# Patient Record
Sex: FEMALE | HISPANIC OR LATINO | URBAN - NONMETROPOLITAN AREA
[De-identification: names, ages, dates, MRNs, and addresses within clinical notes are randomized per-mention and may not be internally consistent; named-entity substitution may affect disease eponyms.]

---

## 2018-01-02 ENCOUNTER — NON-PROVIDER VISIT (OUTPATIENT)
Dept: MEDICAL GROUP | Facility: CLINIC | Age: 38
End: 2018-01-02
Payer: MEDICAID

## 2018-01-02 ENCOUNTER — TELEMEDICINE ORIGINATING SITE VISIT (OUTPATIENT)
Dept: MEDICAL GROUP | Facility: CLINIC | Age: 38
End: 2018-01-02
Payer: MEDICAID

## 2018-01-02 ENCOUNTER — TELEMEDICINE2 (OUTPATIENT)
Dept: MEDICAL GROUP | Facility: PHYSICIAN GROUP | Age: 38
End: 2018-01-02
Payer: MEDICAID

## 2018-01-02 VITALS
WEIGHT: 218 LBS | OXYGEN SATURATION: 91 % | HEIGHT: 60 IN | DIASTOLIC BLOOD PRESSURE: 80 MMHG | RESPIRATION RATE: 18 BRPM | TEMPERATURE: 99.5 F | SYSTOLIC BLOOD PRESSURE: 124 MMHG | BODY MASS INDEX: 42.8 KG/M2 | HEART RATE: 103 BPM

## 2018-01-02 DIAGNOSIS — Z97.5 IUD (INTRAUTERINE DEVICE) IN PLACE: ICD-10-CM

## 2018-01-02 DIAGNOSIS — R06.02 SHORTNESS OF BREATH: ICD-10-CM

## 2018-01-02 DIAGNOSIS — G43.909 MIGRAINE WITHOUT STATUS MIGRAINOSUS, NOT INTRACTABLE, UNSPECIFIED MIGRAINE TYPE: ICD-10-CM

## 2018-01-02 DIAGNOSIS — R07.9 CHEST PAIN, UNSPECIFIED TYPE: ICD-10-CM

## 2018-01-02 DIAGNOSIS — R10.10 PAIN OF UPPER ABDOMEN: ICD-10-CM

## 2018-01-02 DIAGNOSIS — F33.9 RECURRENT MAJOR DEPRESSIVE DISORDER, REMISSION STATUS UNSPECIFIED (HCC): ICD-10-CM

## 2018-01-02 DIAGNOSIS — B19.20 HEPATITIS C VIRUS INFECTION WITHOUT HEPATIC COMA, UNSPECIFIED CHRONICITY: ICD-10-CM

## 2018-01-02 DIAGNOSIS — K21.9 GASTROESOPHAGEAL REFLUX DISEASE WITHOUT ESOPHAGITIS: ICD-10-CM

## 2018-01-02 DIAGNOSIS — B18.2 HEP C W/ COMA, CHRONIC: ICD-10-CM

## 2018-01-02 DIAGNOSIS — G47.09 OTHER INSOMNIA: ICD-10-CM

## 2018-01-02 PROBLEM — F32.9 MAJOR DEPRESSIVE DISORDER: Status: ACTIVE | Noted: 2018-01-02

## 2018-01-02 LAB
APPEARANCE UR: NORMAL
BILIRUB UR STRIP-MCNC: NEGATIVE MG/DL
COLOR UR AUTO: YELLOW
GLUCOSE UR STRIP.AUTO-MCNC: NEGATIVE MG/DL
KETONES UR STRIP.AUTO-MCNC: NEGATIVE MG/DL
LEUKOCYTE ESTERASE UR QL STRIP.AUTO: NORMAL
NITRITE UR QL STRIP.AUTO: NEGATIVE
PH UR STRIP.AUTO: 6 [PH] (ref 5–8)
PROT UR QL STRIP: NEGATIVE MG/DL
RBC UR QL AUTO: NORMAL
SP GR UR STRIP.AUTO: 1.02
UROBILINOGEN UR STRIP-MCNC: 0.2 MG/DL

## 2018-01-02 PROCEDURE — 81002 URINALYSIS NONAUTO W/O SCOPE: CPT | Performed by: NURSE PRACTITIONER

## 2018-01-02 PROCEDURE — 99204 OFFICE O/P NEW MOD 45 MIN: CPT | Mod: GT,25 | Performed by: NURSE PRACTITIONER

## 2018-01-02 PROCEDURE — 93000 ELECTROCARDIOGRAM COMPLETE: CPT | Performed by: NURSE PRACTITIONER

## 2018-01-02 RX ORDER — GABAPENTIN 300 MG/1
300 CAPSULE ORAL 3 TIMES DAILY
Refills: 1 | COMMUNITY
Start: 2017-12-29

## 2018-01-02 RX ORDER — SUMATRIPTAN 100 MG/1
TABLET, FILM COATED ORAL
Refills: 9 | COMMUNITY
Start: 2017-12-01

## 2018-01-02 RX ORDER — OMEPRAZOLE 20 MG/1
20 CAPSULE, DELAYED RELEASE ORAL DAILY
Qty: 30 CAP | Refills: 1 | Status: SHIPPED | OUTPATIENT
Start: 2018-01-02 | End: 2018-01-03 | Stop reason: SDUPTHER

## 2018-01-02 RX ORDER — BUTALBITAL, ACETAMINOPHEN AND CAFFEINE 50; 325; 40 MG/1; MG/1; MG/1
TABLET ORAL
Refills: 0 | COMMUNITY
Start: 2017-11-01

## 2018-01-02 RX ORDER — ONDANSETRON 4 MG/1
TABLET, ORALLY DISINTEGRATING ORAL
Refills: 0 | COMMUNITY
Start: 2017-12-01

## 2018-01-02 RX ORDER — ARIPIPRAZOLE 10 MG/1
TABLET ORAL
Refills: 1 | COMMUNITY
Start: 2017-12-29

## 2018-01-02 RX ORDER — TRAZODONE HYDROCHLORIDE 300 MG/1
300 TABLET ORAL
Refills: 9 | COMMUNITY
Start: 2017-12-01 | End: 2018-01-03

## 2018-01-02 RX ORDER — BUPROPION HYDROCHLORIDE 150 MG/1
150 TABLET ORAL EVERY MORNING
Refills: 1 | COMMUNITY
Start: 2017-12-29

## 2018-01-02 RX ORDER — MISOPROSTOL 200 UG/1
TABLET ORAL
Refills: 0 | COMMUNITY
Start: 2017-10-19

## 2018-01-02 RX ORDER — LOPERAMIDE HYDROCHLORIDE 2 MG/1
2 CAPSULE ORAL 4 TIMES DAILY PRN
Qty: 30 CAP | Refills: 1 | Status: SHIPPED | OUTPATIENT
Start: 2018-01-02 | End: 2018-01-03 | Stop reason: SDUPTHER

## 2018-01-02 ASSESSMENT — PATIENT HEALTH QUESTIONNAIRE - PHQ9
SUM OF ALL RESPONSES TO PHQ QUESTIONS 1-9: 21
CLINICAL INTERPRETATION OF PHQ2 SCORE: 5
5. POOR APPETITE OR OVEREATING: 3 - NEARLY EVERY DAY

## 2018-01-02 NOTE — PATIENT INSTRUCTIONS
1. Please obtain thermometer  2. Hydrate with water all day long  3. You can take Imodium for diarrhea  4. YOU NEED TO have follow up with Primary for the HEP C .

## 2018-01-02 NOTE — ASSESSMENT & PLAN NOTE
Patient was identified has having HEP C during a GYN visit. She admits to IVDU with Meth in 2016. Remaining labs for HIV were negative. She is currently scheduled to see a PCP at [place] on January 25, 2018. She is unsure of the next steps.  She currently has abdominal pain and bloating since October 2017.  Dark urine  Diarrhea explosive  No icterus  Appetite is poor but no weight loss.  Difficult to hydrate

## 2018-01-02 NOTE — PROGRESS NOTES
Kiara Chin is a 37 y.o. female here for a non-provider visit for UA & EKG    If abnormal was an in office provider notified today (if so, indicate provider)? Yes  Routed to PCP? Yes

## 2018-01-03 ENCOUNTER — TELEPHONE (OUTPATIENT)
Dept: MEDICAL GROUP | Facility: PHYSICIAN GROUP | Age: 38
End: 2018-01-03

## 2018-01-03 ENCOUNTER — TELEPHONE (OUTPATIENT)
Dept: MEDICAL GROUP | Facility: CLINIC | Age: 38
End: 2018-01-03

## 2018-01-03 RX ORDER — TRAZODONE HYDROCHLORIDE 100 MG/1
200 TABLET ORAL
Qty: 60 TAB | Refills: 3 | Status: SHIPPED
Start: 2018-01-03

## 2018-01-03 RX ORDER — LOPERAMIDE HYDROCHLORIDE 2 MG/1
2 CAPSULE ORAL 4 TIMES DAILY PRN
Qty: 30 CAP | Refills: 1 | Status: SHIPPED
Start: 2018-01-03

## 2018-01-03 RX ORDER — OMEPRAZOLE 20 MG/1
20 CAPSULE, DELAYED RELEASE ORAL DAILY
Qty: 30 CAP | Refills: 1 | Status: SHIPPED
Start: 2018-01-03

## 2018-01-03 RX ORDER — PANTOPRAZOLE SODIUM 20 MG/1
20 TABLET, DELAYED RELEASE ORAL DAILY
Qty: 30 TAB | Refills: 3 | Status: SHIPPED | OUTPATIENT
Start: 2018-01-03

## 2018-01-03 RX ORDER — TRAZODONE HYDROCHLORIDE 100 MG/1
200 TABLET ORAL
Qty: 60 TAB | Refills: 3 | Status: SHIPPED | OUTPATIENT
Start: 2018-01-03 | End: 2018-01-03 | Stop reason: SDUPTHER

## 2018-01-03 NOTE — ASSESSMENT & PLAN NOTE
Patient described both chronic and acute pain to upper abdomen. Tight band like gas around her belly. Then the bloated feeling will go away and the tight band will disappear. She is passing gas and having chronic diarrhea for about 4 months. The diarrhea is light and then goes to brown. Very rare that she has a formed stool.   No dysuria. Some low back pain bilateral is present. Malaise and poor appetite. Drinks probably less than 20 oz of fluid daily. She was taking PPI and that was helping.

## 2018-01-03 NOTE — PROGRESS NOTES
Chief Complaint   Patient presents with   • Abdominal Pain       HISTORY OF PRESENT ILLNESS: Patient is a 37 y.o. female [facility] SAME DAY  Patient, who presents today to discuss:  Verified Identification with patient.  Secured video conference with RN presenter in [place], [place]        Hepatitis C virus infection  Patient was identified has having HEP C during a GYN visit. She admits to IVDU with Meth in . Remaining labs for HIV were negative. She is currently scheduled to see a PCP at [place] on 2018. She is unsure of the next steps.  She currently has abdominal pain and bloating since 2017.  Dark urine  Diarrhea explosive  No icterus  Appetite is poor but no weight loss.  Difficult to hydrate    Migraines  Long standing Migraines. She does take her imitrex periodically for this. Recent migraine this am and took imitrex.     IUD (intrauterine device) in place  Mirena IUD.     Pain of upper abdomen  Patient described both chronic and acute pain to upper abdomen. Tight band like gas around her belly. Then the bloated feeling will go away and the tight band will disappear. She is passing gas and having chronic diarrhea for about 4 months. The diarrhea is light and then goes to brown. Very rare that she has a formed stool.   No dysuria. Some low back pain bilateral is present. Malaise and poor appetite. Drinks probably less than 20 oz of fluid daily. She was taking PPI and that was helping.     Shortness of breath  Recent development where she gets winded with exertion. Some tightness in her chest - right chest region in the last two days. No cough. No sore throat. No earache. No sinus pressure. No wheeze.  Has low grade temperature today at 99.5 with mild increase in heart rate.     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 She will smoke MJ periodically for the anxiety. She is on medications from  [facility] in [place].    Current Outpatient Prescriptions:   •  buPROPion, 150 mg, Oral, QAM, Taking  •  gabapentin, 300 mg, Oral, TID, Taking  •  misoprostol, PLACE 1 TAB IN VAGINA 12 HOURS PRIOR TO PROCEDURE, Taking  •  sumatriptan, TAKE 1 TAB BY MOUTH AS NEEDEED, Taking  •  ondansetron, DISSOLVE 1 TABLET UNDER THE TONGUE EVERY 6 HOURS AS NEEDED FOR NAUSEA AND VOMITING, Taking  •  trazodone, Take 300 mg by mouth., Taking  •  omeprazole, 20 mg, Oral, DAILY  •  loperamide, 2 mg, Oral, 4X/DAY PRN  •  aripiprazole, TAKE 1/2 TAB BY MOUTH EVERY DAY AT BEDTIME FOR 1 WEEK THEN 1 TAB AT BEDTIME  •  acetaminophen/caffeine/butalbital 325-40-50 mg, TAKE 2 TABS BY MOUTH EVERY 6 HOURS FOR 2 DAYS AS NEEDED FOR HEADACHE          Allergies:Patient has no known allergies.    Current Outpatient Prescriptions Ordered in [facility]   Medication Sig Dispense Refill   • buPROPion (WELLBUTRIN XL) 150 MG XL tablet Take 150 mg by mouth every morning.  1   • gabapentin (NEURONTIN) 300 MG Cap Take 300 mg by mouth 3 times a day.  1   • misoprostol (CYTOTEC) 200 MCG Tab PLACE 1 TAB IN VAGINA 12 HOURS PRIOR TO PROCEDURE  0   • sumatriptan (IMITREX) 100 MG tablet TAKE 1 TAB BY MOUTH AS NEEDEED  9   • ondansetron (ZOFRAN ODT) 4 MG TABLET DISPERSIBLE DISSOLVE 1 TABLET UNDER THE TONGUE EVERY 6 HOURS AS NEEDED FOR NAUSEA AND VOMITING  0   • trazodone (DESYREL) 300 MG tablet Take 300 mg by mouth.  9   • omeprazole (PRILOSEC) 20 MG delayed-release capsule Take 1 Cap by mouth every day. 30 Cap 1   • loperamide (IMODIUM) 2 MG Cap Take 1 Cap by mouth 4 times a day as needed for Diarrhea. 30 Cap 1   • aripiprazole (ABILIFY) 10 MG Tab TAKE 1/2 TAB BY MOUTH EVERY DAY AT BEDTIME FOR 1 WEEK THEN 1 TAB AT BEDTIME  1   • acetaminophen/caffeine/butalbital 325-40-50 mg (FIORICET) -40 MG Tab TAKE 2 TABS BY MOUTH EVERY 6 HOURS FOR 2 DAYS AS NEEDED FOR HEADACHE  0     No current [facility]-ordered facility-administered medications on file.        Past Medical History:    Diagnosis Date   • GERD (gastroesophageal reflux disease)    • Hep C w/o coma, chronic (CMS-HCC)    • IVDU (intravenous drug user)        Social History   Substance Use Topics   • Smoking status: Never Smoker   • Smokeless tobacco: Never Used   • Alcohol use No       Family Status   Relation Status   • Mother    • Father      Family History   Problem Relation Age of Onset   • Diabetes Mother    • Heart Failure Father        ROS: As documented in my HPI      Exam:  Blood pressure 124/80, pulse (!) 103, temperature 37.5 °C (99.5 °F), resp. rate 18, height 1.524 m (5'), weight 98.9 kg (218 lb), SpO2 91 %.  General:  Well nourished, well developed female in NAD  Visual : Head shape round nontender scalp  Face flushed  HEENT: Eyes conjunctiva is clear, lids without ptosis, pupils equal round and reactive to light and accommodation.  Ears normal shape and contour, canals are clear bilaterally, TMs with good light reflex and appear normal.  Nasal mucosa pale and edematous with clear rhinorrhea.  Oropharynx benign.  Sinuses (frontal and maxillary) nontender to palpation.  Neck: Supple.   Thyroid: symmetric  Pulmonary:  Normal effort. No rales, ronchi, or wheezing.  Cardiovascular: Regular rate and rhythm without murmur.   Abdomen: Soft nontender, normal bowel sounds  Palpation from Telepresenter  Soft pain: present through out abdomen Rebound - yes Guarding - no   Extremities: no clubbing, cyanosis, or edema.  Psych: Alert and oriented x3. Normal mood and affect.   SKIN: rashes none  Neurological: No focal deficits      Please note that this dictation was created using voice recognition software. I have made every reasonable attempt to correct obvious errors, but I expect that there are errors of grammar and possibly content that I did not discover before finalizing the note.    Assessment/Plan:  1. Migraine without status migrainosus, not intractable, unspecified migraine type  Current status of condition is  chronic and controlled on therapy.     2. Hepatitis C virus infection without hepatic coma, unspecified chronicity  Need to follow up with PCP in [place]   3. Other insomnia  Current status of condition is chronic and controlled on therapy.   Trazodone 200 mg at night   4. Recurrent major depressive disorder, remission status unspecified (CMS-HCC)  Current status of condition is chronic and controlled on therapy.  Sees mental health in [place]   5. Shortness of breath  Stable    6. Pain of upper abdomen  POCT Urinalysis   7. Gastroesophageal reflux disease without esophagitis  Prilosec 20 mg    8. IUD (intrauterine device) in place  Current status of condition is chronic and controlled on therapy.     9. Chest pain, unspecified type  EKG - Clinic Performed        I counseled patient on the fact that I could carry out many orders:   I would recommend she get a CT of abdomen.  CMP, VIT D, TSH, GENOTYPE and viral load for HEP C  Follow up with GI per telemedicine.    ER precautions discussed    Patient will follow up with her PCP in [place].

## 2018-01-03 NOTE — TELEPHONE ENCOUNTER
Called patient. She is feeling better.  Abdominal pain resolved.  Need RX phoned in, [facility] said they did not receive Imodium, trazodone, Prilosec.

## 2018-01-03 NOTE — ASSESSMENT & PLAN NOTE
Long standing Migraines. She does take her imitrex periodically for this. Recent migraine this am and took imitrex.

## 2018-01-03 NOTE — ASSESSMENT & PLAN NOTE
Recent development where she gets winded with exertion. Some tightness in her chest - right chest region in the last two days. No cough. No sore throat. No earache. No sinus pressure. No wheeze.  Has low grade temperature today at 99.5 with mild increase in heart rate.

## 2018-01-03 NOTE — ASSESSMENT & PLAN NOTE
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 She will smoke MJ periodically for the anxiety. She is on medications from [facility] in [place].    Current Outpatient Prescriptions:   •  buPROPion, 150 mg, Oral, QAM, Taking  •  gabapentin, 300 mg, Oral, TID, Taking  •  misoprostol, PLACE 1 TAB IN VAGINA 12 HOURS PRIOR TO PROCEDURE, Taking  •  sumatriptan, TAKE 1 TAB BY MOUTH AS NEEDEED, Taking  •  ondansetron, DISSOLVE 1 TABLET UNDER THE TONGUE EVERY 6 HOURS AS NEEDED FOR NAUSEA AND VOMITING, Taking  •  trazodone, Take 300 mg by mouth., Taking  •  omeprazole, 20 mg, Oral, DAILY  •  loperamide, 2 mg, Oral, 4X/DAY PRN  •  aripiprazole, TAKE 1/2 TAB BY MOUTH EVERY DAY AT BEDTIME FOR 1 WEEK THEN 1 TAB AT BEDTIME  •  acetaminophen/caffeine/butalbital 325-40-50 mg, TAKE 2 TABS BY MOUTH EVERY 6 HOURS FOR 2 DAYS AS NEEDED FOR HEADACHE

## 2018-01-10 ENCOUNTER — NON-PROVIDER VISIT (OUTPATIENT)
Dept: MEDICAL GROUP | Facility: CLINIC | Age: 38
End: 2018-01-10
Payer: MEDICAID

## 2018-01-10 DIAGNOSIS — B19.20 HEPATITIS C VIRUS INFECTION WITHOUT HEPATIC COMA, UNSPECIFIED CHRONICITY: ICD-10-CM

## 2018-01-10 PROCEDURE — 36415 COLL VENOUS BLD VENIPUNCTURE: CPT | Performed by: INTERNAL MEDICINE

## 2018-01-10 NOTE — NON-PROVIDER
Kiara Chin is a 37 y.o. female here for a non-provider visit for venipuncture.    If abnormal was an in office provider notified today (if so, indicate provider)? Yes  Routed to PCP? Yes

## 2018-04-17 ENCOUNTER — NON-PROVIDER VISIT (OUTPATIENT)
Dept: MEDICAL GROUP | Facility: CLINIC | Age: 38
End: 2018-04-17
Payer: MEDICAID

## 2018-04-17 DIAGNOSIS — B19.20 HEPATITIS C VIRUS INFECTION WITHOUT HEPATIC COMA, UNSPECIFIED CHRONICITY: ICD-10-CM

## 2018-04-17 PROCEDURE — 36415 COLL VENOUS BLD VENIPUNCTURE: CPT | Performed by: INTERNAL MEDICINE

## 2018-04-17 NOTE — NON-PROVIDER
Kiara Chin is a 37 y.o. female here for a non-provider visit for Venipuncture    If abnormal was an in office provider notified today (if so, indicate provider)? Yes  Routed to PCP? Yes

## 2018-04-20 ENCOUNTER — TELEPHONE (OUTPATIENT)
Dept: MEDICAL GROUP | Facility: CLINIC | Age: 38
End: 2018-04-20

## 2018-04-20 DIAGNOSIS — B19.20 HEPATITIS C VIRUS INFECTION WITHOUT HEPATIC COMA, UNSPECIFIED CHRONICITY: ICD-10-CM

## 2018-04-20 NOTE — TELEPHONE ENCOUNTER
Please see media for Lab Orders from Dr. Fonseca @ [facility].    Labs ordered:  CMP  CBC with Diff  HCV RNA, Quant. Real Time PCR

## 2018-04-25 ENCOUNTER — TELEPHONE (OUTPATIENT)
Dept: MEDICAL GROUP | Facility: PHYSICIAN GROUP | Age: 38
End: 2018-04-25